# Patient Record
Sex: FEMALE | Race: BLACK OR AFRICAN AMERICAN | NOT HISPANIC OR LATINO | Employment: FULL TIME | URBAN - METROPOLITAN AREA
[De-identification: names, ages, dates, MRNs, and addresses within clinical notes are randomized per-mention and may not be internally consistent; named-entity substitution may affect disease eponyms.]

---

## 2023-04-18 ENCOUNTER — SOCIAL WORK (OUTPATIENT)
Dept: ADMINISTRATIVE | Facility: OTHER | Age: 18
End: 2023-04-18

## 2023-04-18 NOTE — PROGRESS NOTES
SW completed pt's notification of pregnancy and faxed/scanned into epic. No other needs identified at this time.     Lo Hamlin,MSW  Pager#[phone]

## 2023-05-05 ENCOUNTER — PATIENT MESSAGE (OUTPATIENT)
Dept: ADMINISTRATIVE | Facility: OTHER | Age: 18
End: 2023-05-05

## 2023-05-12 ENCOUNTER — PATIENT MESSAGE (OUTPATIENT)
Dept: ADMINISTRATIVE | Facility: OTHER | Age: 18
End: 2023-05-12

## 2023-05-22 PROBLEM — Z34.92 ENCOUNTER FOR SUPERVISION OF LOW-RISK PREGNANCY IN SECOND TRIMESTER: Status: ACTIVE | Noted: 2023-05-22

## 2023-05-22 PROBLEM — O09.899 SUPERVISION OF OTHER HIGH RISK PREGNANCY, ANTEPARTUM: Status: ACTIVE | Noted: 2023-05-22

## 2023-07-19 PROBLEM — Z34.92 ENCOUNTER FOR SUPERVISION OF LOW-RISK PREGNANCY IN SECOND TRIMESTER: Status: RESOLVED | Noted: 2023-05-22 | Resolved: 2023-07-19

## 2023-07-19 PROBLEM — O35.09X0 PREGNANCY COMPLICATED BY FETAL CEREBRAL VENTRICULOMEGALY: Status: ACTIVE | Noted: 2023-07-19

## 2023-08-09 PROBLEM — R76.8 POSITIVE SERUM CYTOMEGALOVIRUS (CMV) IGM ANTIBODY: Status: ACTIVE | Noted: 2023-08-09

## 2023-08-23 PROBLEM — Z36.2 SCREENING, ANTENATAL, BY AMNIOCENTESIS: Status: ACTIVE | Noted: 2023-08-23

## 2023-08-28 PROBLEM — O99.013 ANEMIA DURING PREGNANCY IN THIRD TRIMESTER: Status: ACTIVE | Noted: 2023-08-28

## 2023-08-28 PROBLEM — O36.5930 POOR FETAL GROWTH AFFECTING MANAGEMENT OF MOTHER IN THIRD TRIMESTER: Status: ACTIVE | Noted: 2023-08-28

## 2023-10-17 PROBLEM — R03.0 SINGLE EPISODE OF ELEVATED BLOOD PRESSURE: Status: ACTIVE | Noted: 2023-10-17

## 2023-11-08 PROBLEM — O36.8130 DECREASED FETAL MOVEMENTS IN THIRD TRIMESTER: Status: ACTIVE | Noted: 2023-11-08

## 2023-11-10 PROBLEM — O99.013 ANEMIA DURING PREGNANCY IN THIRD TRIMESTER: Status: RESOLVED | Noted: 2023-08-28 | Resolved: 2023-11-10

## 2023-11-10 PROBLEM — O36.5930 POOR FETAL GROWTH AFFECTING MANAGEMENT OF MOTHER IN THIRD TRIMESTER: Status: RESOLVED | Noted: 2023-08-28 | Resolved: 2023-11-10

## 2023-11-10 PROBLEM — R03.0 SINGLE EPISODE OF ELEVATED BLOOD PRESSURE: Status: RESOLVED | Noted: 2023-10-17 | Resolved: 2023-11-10

## 2023-11-10 PROBLEM — O35.09X0 PREGNANCY COMPLICATED BY FETAL CEREBRAL VENTRICULOMEGALY: Status: RESOLVED | Noted: 2023-07-19 | Resolved: 2023-11-10

## 2023-11-10 PROBLEM — R76.8 POSITIVE SERUM CYTOMEGALOVIRUS (CMV) IGM ANTIBODY: Status: RESOLVED | Noted: 2023-08-09 | Resolved: 2023-11-10

## 2023-11-11 PROBLEM — O13.9 GESTATIONAL HYPERTENSION: Status: ACTIVE | Noted: 2023-11-11

## 2023-12-19 PROBLEM — O09.899 SUPERVISION OF OTHER HIGH RISK PREGNANCY, ANTEPARTUM: Status: RESOLVED | Noted: 2023-05-22 | Resolved: 2023-12-19

## 2023-12-19 PROBLEM — O36.8130 DECREASED FETAL MOVEMENTS IN THIRD TRIMESTER: Status: RESOLVED | Noted: 2023-11-08 | Resolved: 2023-12-19

## 2023-12-19 PROBLEM — Z36.2 SCREENING, ANTENATAL, BY AMNIOCENTESIS: Status: RESOLVED | Noted: 2023-08-23 | Resolved: 2023-12-19

## 2023-12-19 PROBLEM — O13.9 GESTATIONAL HYPERTENSION: Status: RESOLVED | Noted: 2023-11-11 | Resolved: 2023-12-19
